# Patient Record
Sex: MALE | URBAN - METROPOLITAN AREA
[De-identification: names, ages, dates, MRNs, and addresses within clinical notes are randomized per-mention and may not be internally consistent; named-entity substitution may affect disease eponyms.]

---

## 2021-10-04 ENCOUNTER — OFFICE VISIT (OUTPATIENT)
Dept: URBAN - METROPOLITAN AREA CLINIC 10 | Facility: CLINIC | Age: 61
End: 2021-10-04
Payer: COMMERCIAL

## 2021-10-04 DIAGNOSIS — Z79.84 LONG TERM USE OF ORAL ANTIDIABETIC DRUGS: ICD-10-CM

## 2021-10-04 DIAGNOSIS — H40.013 OPEN ANGLE WITH BORDERLINE FINDINGS, LOW RISK, BILATERAL: ICD-10-CM

## 2021-10-04 DIAGNOSIS — H25.13 AGE-RELATED NUCLEAR CATARACT, BILATERAL: ICD-10-CM

## 2021-10-04 DIAGNOSIS — E11.9 DIABETES MELLITUS TYPE 2 WITHOUT MENTION OF COMPLICATION: Primary | ICD-10-CM

## 2021-10-04 DIAGNOSIS — H35.431 PAVING STONE DEGENERATION OF RETINA, RIGHT EYE: ICD-10-CM

## 2021-10-04 PROCEDURE — 92134 CPTRZ OPH DX IMG PST SGM RTA: CPT | Performed by: STUDENT IN AN ORGANIZED HEALTH CARE EDUCATION/TRAINING PROGRAM

## 2021-10-04 PROCEDURE — 92004 COMPRE OPH EXAM NEW PT 1/>: CPT | Performed by: STUDENT IN AN ORGANIZED HEALTH CARE EDUCATION/TRAINING PROGRAM

## 2021-10-04 ASSESSMENT — VISUAL ACUITY
OS: 20/25
OD: 20/20

## 2021-10-04 ASSESSMENT — INTRAOCULAR PRESSURE
OD: 15
OS: 13

## 2021-10-04 NOTE — IMPRESSION/PLAN
Impression: Open angle with borderline findings, low risk, bilateral: H40.013.  Plan: Patient educated on findings,
suspect due to cupping OU 

RTC 4 month IOP check c possible HVF 24-2, RNFL OCT, and Pach's

## 2021-10-04 NOTE — IMPRESSION/PLAN
Impression: Diabetes mellitus Type 2 without mention of complication: T78.5. Plan: Patient educated on condition, importance of diet, exercise, and regular follow ups with PCP. No NPDR or DME present, good blood sugar control continue to monitor with annual DM DFE.

## 2021-10-04 NOTE — IMPRESSION/PLAN
Impression: Long term use of oral antidiabetic drugs: Z79.84. Plan: Patient educated on condition, importance of diet, exercise, and regular follow ups with PCP. No NPDR or DME present, good blood sugar control continue to monitor with annual DM DFE.

## 2022-07-18 ENCOUNTER — OFFICE VISIT (OUTPATIENT)
Dept: URBAN - METROPOLITAN AREA CLINIC 10 | Facility: CLINIC | Age: 62
End: 2022-07-18
Payer: COMMERCIAL

## 2022-07-18 DIAGNOSIS — Z79.84 LONG TERM USE OF ORAL ANTIDIABETIC DRUGS: ICD-10-CM

## 2022-07-18 DIAGNOSIS — H25.13 AGE-RELATED NUCLEAR CATARACT, BILATERAL: ICD-10-CM

## 2022-07-18 DIAGNOSIS — E11.9 DIABETES MELLITUS TYPE 2 WITHOUT MENTION OF COMPLICATION: Primary | ICD-10-CM

## 2022-07-18 DIAGNOSIS — H43.313 VITREOUS MEMBRANES AND STRANDS, BILATERAL: ICD-10-CM

## 2022-07-18 PROCEDURE — 92134 CPTRZ OPH DX IMG PST SGM RTA: CPT | Performed by: STUDENT IN AN ORGANIZED HEALTH CARE EDUCATION/TRAINING PROGRAM

## 2022-07-18 PROCEDURE — 99214 OFFICE O/P EST MOD 30 MIN: CPT | Performed by: STUDENT IN AN ORGANIZED HEALTH CARE EDUCATION/TRAINING PROGRAM

## 2022-07-18 ASSESSMENT — VISUAL ACUITY
OS: 20/20
OD: 20/20

## 2022-07-18 ASSESSMENT — KERATOMETRY
OS: 45.38
OD: 46.25

## 2022-07-18 ASSESSMENT — INTRAOCULAR PRESSURE
OD: 19
OS: 19

## 2022-07-18 NOTE — IMPRESSION/PLAN
Impression: Diabetes mellitus Type 2 without mention of complication: U81.6. Plan: Patient educated on condition, importance of diet, exercise, and regular follow ups with PCP. No NPDR or DME present, good blood sugar control continue to monitor with annual DM DFE.

## 2023-12-26 ENCOUNTER — OFFICE VISIT (OUTPATIENT)
Dept: URBAN - METROPOLITAN AREA CLINIC 10 | Facility: CLINIC | Age: 63
End: 2023-12-26
Payer: COMMERCIAL

## 2023-12-26 DIAGNOSIS — H15.122 NODULAR EPISCLERITIS, LEFT EYE: Primary | ICD-10-CM

## 2023-12-26 PROCEDURE — 99214 OFFICE O/P EST MOD 30 MIN: CPT | Performed by: OPTOMETRIST

## 2023-12-26 RX ORDER — PREDNISOLONE ACETATE 10 MG/ML
1 % SUSPENSION/ DROPS OPHTHALMIC
Qty: 15 | Refills: 1 | Status: ACTIVE
Start: 2023-12-26

## 2023-12-26 ASSESSMENT — INTRAOCULAR PRESSURE
OS: 16
OD: 16

## 2024-12-27 ENCOUNTER — OFFICE VISIT (OUTPATIENT)
Dept: URBAN - METROPOLITAN AREA CLINIC 10 | Facility: CLINIC | Age: 64
End: 2024-12-27
Payer: COMMERCIAL

## 2024-12-27 DIAGNOSIS — H43.313 VITREOUS MEMBRANES AND STRANDS, BILATERAL: ICD-10-CM

## 2024-12-27 DIAGNOSIS — E11.9 DIABETES MELLITUS TYPE 2 WITHOUT MENTION OF COMPLICATION: Primary | ICD-10-CM

## 2024-12-27 DIAGNOSIS — H25.13 AGE-RELATED NUCLEAR CATARACT, BILATERAL: ICD-10-CM

## 2024-12-27 PROCEDURE — 92014 COMPRE OPH EXAM EST PT 1/>: CPT | Performed by: OPTOMETRIST

## 2024-12-27 PROCEDURE — 92134 CPTRZ OPH DX IMG PST SGM RTA: CPT | Performed by: OPTOMETRIST

## 2024-12-27 ASSESSMENT — INTRAOCULAR PRESSURE
OD: 17
OS: 18

## 2024-12-27 ASSESSMENT — VISUAL ACUITY
OD: 20/20
OS: 20/20